# Patient Record
Sex: FEMALE | Race: WHITE | NOT HISPANIC OR LATINO | Employment: FULL TIME | URBAN - METROPOLITAN AREA
[De-identification: names, ages, dates, MRNs, and addresses within clinical notes are randomized per-mention and may not be internally consistent; named-entity substitution may affect disease eponyms.]

---

## 2024-03-13 ENCOUNTER — OFFICE VISIT (OUTPATIENT)
Age: 49
End: 2024-03-13
Payer: COMMERCIAL

## 2024-03-13 VITALS
WEIGHT: 139 LBS | DIASTOLIC BLOOD PRESSURE: 84 MMHG | SYSTOLIC BLOOD PRESSURE: 140 MMHG | BODY MASS INDEX: 27.29 KG/M2 | HEIGHT: 60 IN | HEART RATE: 70 BPM

## 2024-03-13 DIAGNOSIS — R00.2 PALPITATIONS: Primary | ICD-10-CM

## 2024-03-13 PROCEDURE — 93000 ELECTROCARDIOGRAM COMPLETE: CPT | Performed by: NURSE PRACTITIONER

## 2024-03-13 PROCEDURE — 99213 OFFICE O/P EST LOW 20 MIN: CPT | Performed by: NURSE PRACTITIONER

## 2024-03-13 NOTE — PROGRESS NOTES
"    CARDIOLOGY        Patient Name: Kaity Holcomb  :1975  Age: 48 y.o.  Primary Cardiologist: Adal Garrett MD  Encounter Provider:  MELITON Blandon    Date of Service: 24    CHIEF COMPLAINT / REASON FOR OFFICE VISIT     Chest Pain      HISTORY OF PRESENT ILLNESS       Chest Pain   Associated symptoms include irregular heartbeat and palpitations. Pertinent negatives include no cough or fever.     Kaity Holcomb is a 48 y.o. female who presents today for semiannual evaluation.     Pt has a  history significant for palpitations.    Patient establish care with Dr. Garrett in 2023 where she was experiencing atypical chest discomfort as well as heart palpitations.  It was thought that chest discomfort was musculoskeletal in etiology.  It was determined that patient can monitor her symptoms of palpitations with her wearable device.    Patient reports that she continues to have daily ongoing symptoms of heart palpitations.  She notes that she frequently checks a ECG with her wearable device, I have personally reviewed ECG tracings from patient's apple iWatch via her phone and my interpretation is normal sinus rhythm with PVC.  She denies any chest pain, dyspnea at rest or with exertion, increased fatigue.    The following portions of the patient's history were reviewed and updated as appropriate: allergies, current medications, past family history, past medical history, past social history, past surgical history and problem list.      VITAL SIGNS     Visit Vitals  /84   Pulse 70   Ht 152.4 cm (60\")   Wt 63 kg (139 lb)   BMI 27.15 kg/m²         Wt Readings from Last 3 Encounters:   24 63 kg (139 lb)   23 64.4 kg (142 lb)   23 63 kg (139 lb)     Body mass index is 27.15 kg/m².      REVIEW OF SYSTEMS     Review of Systems   Constitutional: Negative for chills, fever, weight gain and weight loss.   Cardiovascular:  Positive for chest pain, irregular heartbeat and " palpitations. Negative for leg swelling.   Respiratory:  Negative for cough, snoring and wheezing.    Hematologic/Lymphatic: Negative for bleeding problem. Does not bruise/bleed easily.   Skin:  Negative for color change.   Musculoskeletal:  Negative for falls, joint pain and myalgias.   Gastrointestinal:  Negative for melena.   Genitourinary:  Negative for hematuria.   Neurological:  Negative for excessive daytime sleepiness.   Psychiatric/Behavioral:  Negative for depression. The patient is not nervous/anxious.            PHYSICAL EXAMINATION     Vitals and nursing note reviewed.   Constitutional:       Appearance: Normal appearance. Well-developed.   Eyes:      Conjunctiva/sclera: Conjunctivae normal.   Neck:      Vascular: No carotid bruit.   Pulmonary:      Breath sounds: Normal breath sounds.   Cardiovascular:      Normal rate. Regular rhythm. Normal S1 with normal intensity. Normal S2 with normal intensity.       Murmurs: There is no murmur.      No gallop.  No click. No rub.   Edema:     Peripheral edema absent.   Musculoskeletal: Normal range of motion. Skin:     General: Skin is warm and dry.   Neurological:      Mental Status: Alert and oriented to person, place, and time.      GCS: GCS eye subscore is 4. GCS verbal subscore is 5. GCS motor subscore is 6.   Psychiatric:         Speech: Speech normal.         Behavior: Behavior normal.         Thought Content: Thought content normal.         Judgment: Judgment normal.           REVIEWED DATA       ECG 12 Lead    Date/Time: 3/13/2024 9:29 AM  Performed by: Haydee Cervantes APRN    Authorized by: Haydee Cervantes APRN  Comparison: compared with previous ECG from 5/5/2023  Rhythm: sinus rhythm  Rate: normal  BPM: 70  Conduction: conduction normal  ST Segments: ST segments normal  T Waves: T waves normal  QRS axis: normal    Clinical impression: normal ECG            Lipid Panel          5/12/2023    08:20   Lipid Panel   Total Cholesterol 170   "  Triglycerides 72    HDL Cholesterol 77    VLDL Cholesterol 14    LDL Cholesterol  79    LDL/HDL Ratio 1.02        Lab Results   Component Value Date     05/12/2023     05/05/2023    K 3.9 05/12/2023    K 3.5 05/05/2023     05/12/2023     05/05/2023    CO2 26.7 05/12/2023    CO2 22.1 05/05/2023    BUN 14 05/12/2023    BUN 12 05/05/2023    CREATININE 0.74 05/12/2023    CREATININE 0.86 05/05/2023    EGFRIFNONA 90 10/22/2018    GLUCOSE 98 05/12/2023    GLUCOSE 100 (H) 05/05/2023    CALCIUM 9.3 05/12/2023    CALCIUM 9.4 05/05/2023    PROTENTOTREF 6.9 05/12/2023    ALBUMIN 4.5 05/12/2023    ALBUMIN 4.5 05/05/2023    BILITOT 0.6 05/12/2023    BILITOT 0.7 05/05/2023    AST 13 05/12/2023    AST 14 05/05/2023    ALT 18 05/12/2023    ALT 14 05/05/2023     Lab Results   Component Value Date    WBC 7.93 05/05/2023    WBC 6.82 10/22/2018    HGB 15.0 05/05/2023    HGB 13.8 10/22/2018    HCT 43.0 05/05/2023    HCT 41.2 10/22/2018    MCV 94.5 05/05/2023    MCV 94.3 10/22/2018     05/05/2023     10/22/2018     No results found for: \"PROBNP\", \"BNP\"  Lab Results   Component Value Date    TROPONINT <6 05/05/2023     Lab Results   Component Value Date    TSH 2.930 05/12/2023             ASSESSMENT & PLAN     Diagnoses and all orders for this visit:    1. Palpitations (Primary)  Assessment & Plan:  Patient reports daily heart palpitations, she checks an ECG via her apple iWatch, I have personally reviewed these tracings and my interpretation is sinus rhythm with PVCs.  Patient admits that she gets very anxious when she has these palpitations.  Heart rate has remained controlled  Patient and I discussed placing a monitor, to further assess for dysrhythmia.  Patient declining this at the time.  Informed her that she can call if she changes her mind        Other orders  -     ECG 12 Lead          Future Appointments         Provider Department Center    3/14/2025 11:40 AM Adal Garrett MD [facility] " [facility] MEDICAL [facility] CARDIOLOGY [place]              MEDICATIONS         Discharge Medications            Accurate as of March 13, 2024  3:59 PM. If you have any questions, ask your nurse or doctor.                Continue These Medications        Instructions Start Date   VITAMIN B COMPLEX PO   1 tablet, Oral, Daily             Stop These Medications      famotidine 20 MG tablet  Commonly known as: PEPCID  Stopped by: MELITON Blandon                  **Dragon Disclaimer:   Much of this encounter note is an electronic transcription/translation of spoken language to printed text. The electronic translation of spoken language may permit erroneous, or at times, nonsensical words or phrases to be inadvertently transcribed. Although I have reviewed the note for such errors, some may still exist.

## 2024-03-13 NOTE — ASSESSMENT & PLAN NOTE
Patient reports daily heart palpitations, she checks an ECG via her apple iWatch, I have personally reviewed these tracings and my interpretation is sinus rhythm with PVCs.  Patient admits that she gets very anxious when she has these palpitations.  Heart rate has remained controlled  Patient and I discussed placing a monitor, to further assess for dysrhythmia.  Patient declining this at the time.  Informed her that she can call if she changes her mind

## 2025-03-31 ENCOUNTER — APPOINTMENT (OUTPATIENT)
Dept: WOMENS IMAGING | Facility: HOSPITAL | Age: 50
End: 2025-03-31
Payer: COMMERCIAL

## 2025-03-31 PROCEDURE — 76642 ULTRASOUND BREAST LIMITED: CPT | Performed by: RADIOLOGY

## 2025-03-31 PROCEDURE — 77065 DX MAMMO INCL CAD UNI: CPT | Performed by: RADIOLOGY

## 2025-03-31 PROCEDURE — 77061 BREAST TOMOSYNTHESIS UNI: CPT | Performed by: RADIOLOGY

## 2025-03-31 PROCEDURE — G0279 TOMOSYNTHESIS, MAMMO: HCPCS | Performed by: RADIOLOGY
